# Patient Record
(demographics unavailable — no encounter records)

---

## 2024-12-02 NOTE — PHYSICAL EXAM
[Normal] : normal rate, regular rhythm, normal S1/S2, no murmur [Thrill] : thrill [Hand well perfused] : hand well perfused [Warm Extremities] : warm extremities [2+] : left 2+ [Pulsatile Thrill] : no pulsatile thrill [Aneurysm] : no aneurysm [Bleeding] : no bleeding [Ulcer] : no ulcer [de-identified] : Intact

## 2024-12-02 NOTE — DISCUSSION/SUMMARY
[FreeTextEntry1] : 52-year-old female with ESRD currently on hemodialysis via left radiocephalic AV fistula.  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  Given the patient is without any present issues, we will continue to monitor.  No intervention is required at this time.  Patient to follow-up in 3 to 4 months with repeat duplex.

## 2024-12-02 NOTE — HISTORY OF PRESENT ILLNESS
[] : left radiocephalic fistula [FreeTextEntry1] : Patient is a 52-year-old female with ESRD currently him on dialysis via left upper extremity AV fistula who presents to the office today for routine surveillance.  Patient denies any present issues with dialysis.  Denies rest pain or claudication symptoms.  Denies tissue loss.

## 2025-04-07 NOTE — HISTORY OF PRESENT ILLNESS
[FreeTextEntry1] : Patient is a 52-year-old female with ESRD currently him on dialysis via left upper extremity AV fistula who presents to the office today for routine surveillance.  Patient denies any present issues with dialysis.  Denies rest pain or claudication symptoms.  Denies tissue loss. [] : left radiocephalic fistula

## 2025-04-07 NOTE — DISCUSSION/SUMMARY
[FreeTextEntry1] : 52-year-old female with ESRD currently on hemodialysis via left radiocephalic AV fistula.  Given the patient is without any present issues, we will continue to monitor.  No intervention is required at this time.  Patient to follow-up in 3 to 4 months with repeat duplex.  Patient complaining of itchiness and discoloration at the right medial ankle area.  Patient has strongly palpable pedal pulses.  No evidence of arterial insufficiency.  Suspect venous insufficiency.  Recommend compression stockings and elevation.  Will schedule the patient for venous duplex during next visit.

## 2025-04-07 NOTE — PHYSICAL EXAM
[Normal] : normal rate, regular rhythm, normal S1/S2, no murmur [Thrill] : thrill [Pulsatile Thrill] : no pulsatile thrill [Aneurysm] : no aneurysm [Bleeding] : no bleeding [Hand well perfused] : hand well perfused [Warm Extremities] : warm extremities [Ulcer] : no ulcer [2+] : left 2+ [de-identified] : Intact

## 2025-05-09 NOTE — HISTORY OF PRESENT ILLNESS
[] : right internal jugular tunneled catheter [FreeTextEntry1] : 4/18/2024 Dr. Hines [FreeTextEntry4] : yesterday [FreeTextEntry5] : yesterday at 800pm [FreeTextEntry6] : Dr. Negron

## 2025-05-09 NOTE — ASSESSMENT
[FreeTextEntry1] : Referral from dialysis for difficult cannulation, plan for left fistulogram and possible intervention

## 2025-05-09 NOTE — PROCEDURE
[Resume diet] : resume diet [Site check for bleeding/hematoma/thrill/bruit] : Site check for bleeding/hematoma/thrill/bruit [Vital signs on admission the q 15 mins x2] : Vital signs on admission the q 15 mins x2 [FreeTextEntry1] : Left arm fistula fistulogram/angioplasty

## 2025-05-09 NOTE — PAST MEDICAL HISTORY
[Increasing age ( >40 years old)] : Increasing age ( >40 years old) [No therapy indicated for cases scheduled for less than one hour] : No therapy indicated for cases scheduled for less than one hour. [FreeTextEntry1] : Malignant Hyperthermia Screening Tool and Risk of Bleeding Assessment Ms. STEFANIE JUARES denies family history of unexpected death following Anesthesia or Exercise. Denies Family history of Malignant Hyperthermia, Muscle or Neuromuscular disorder and High Temperature following exercise.  Ms. STEFANIE JUARES denies history of Muscle Spasm, Dark or Chocolate - Colored urine and Unanticipated fever immediately following anesthesia or serious exercise.  Ms. JUARES also denies bleeding tendencies/ Risks of Bleeding.

## 2025-05-09 NOTE — REASON FOR VISIT
[Other ___] : a [unfilled] visit for [Inadequate Flow within AVF] : inadequate flow within AVF [FreeTextEntry2] : referral for difficult cannulation